# Patient Record
Sex: MALE | Race: WHITE | HISPANIC OR LATINO
[De-identification: names, ages, dates, MRNs, and addresses within clinical notes are randomized per-mention and may not be internally consistent; named-entity substitution may affect disease eponyms.]

---

## 2021-07-05 ENCOUNTER — TRANSCRIPTION ENCOUNTER (OUTPATIENT)
Age: 36
End: 2021-07-05

## 2021-07-09 ENCOUNTER — TRANSCRIPTION ENCOUNTER (OUTPATIENT)
Age: 36
End: 2021-07-09

## 2021-08-30 ENCOUNTER — TRANSCRIPTION ENCOUNTER (OUTPATIENT)
Age: 36
End: 2021-08-30

## 2021-09-03 ENCOUNTER — TRANSCRIPTION ENCOUNTER (OUTPATIENT)
Age: 36
End: 2021-09-03

## 2021-10-17 ENCOUNTER — TRANSCRIPTION ENCOUNTER (OUTPATIENT)
Age: 36
End: 2021-10-17

## 2022-04-13 PROBLEM — Z00.00 ENCOUNTER FOR PREVENTIVE HEALTH EXAMINATION: Status: ACTIVE | Noted: 2022-04-13

## 2022-09-25 ENCOUNTER — NON-APPOINTMENT (OUTPATIENT)
Age: 37
End: 2022-09-25

## 2022-09-28 ENCOUNTER — NON-APPOINTMENT (OUTPATIENT)
Age: 37
End: 2022-09-28

## 2023-07-07 ENCOUNTER — APPOINTMENT (OUTPATIENT)
Dept: VASCULAR SURGERY | Facility: CLINIC | Age: 38
End: 2023-07-07
Payer: COMMERCIAL

## 2023-07-07 VITALS — DIASTOLIC BLOOD PRESSURE: 79 MMHG | HEART RATE: 67 BPM | SYSTOLIC BLOOD PRESSURE: 112 MMHG

## 2023-07-07 VITALS
DIASTOLIC BLOOD PRESSURE: 74 MMHG | SYSTOLIC BLOOD PRESSURE: 118 MMHG | BODY MASS INDEX: 25.11 KG/M2 | HEIGHT: 67 IN | HEART RATE: 71 BPM | WEIGHT: 160 LBS

## 2023-07-07 PROCEDURE — 93971 EXTREMITY STUDY: CPT | Mod: RT

## 2023-07-07 PROCEDURE — 99203 OFFICE O/P NEW LOW 30 MIN: CPT

## 2023-07-19 ENCOUNTER — APPOINTMENT (OUTPATIENT)
Dept: THORACIC SURGERY | Facility: CLINIC | Age: 38
End: 2023-07-19
Payer: COMMERCIAL

## 2023-07-26 ENCOUNTER — APPOINTMENT (OUTPATIENT)
Dept: THORACIC SURGERY | Facility: CLINIC | Age: 38
End: 2023-07-26
Payer: COMMERCIAL

## 2023-07-26 ENCOUNTER — NON-APPOINTMENT (OUTPATIENT)
Age: 38
End: 2023-07-26

## 2023-07-26 VITALS
WEIGHT: 165 LBS | DIASTOLIC BLOOD PRESSURE: 78 MMHG | HEIGHT: 68 IN | OXYGEN SATURATION: 97 % | RESPIRATION RATE: 17 BRPM | HEART RATE: 76 BPM | SYSTOLIC BLOOD PRESSURE: 120 MMHG | BODY MASS INDEX: 25.01 KG/M2

## 2023-07-26 DIAGNOSIS — F12.90 CANNABIS USE, UNSPECIFIED, UNCOMPLICATED: ICD-10-CM

## 2023-07-26 DIAGNOSIS — G54.0 BRACHIAL PLEXUS DISORDERS: ICD-10-CM

## 2023-07-26 DIAGNOSIS — Z83.3 FAMILY HISTORY OF DIABETES MELLITUS: ICD-10-CM

## 2023-07-26 PROCEDURE — 99205 OFFICE O/P NEW HI 60 MIN: CPT

## 2023-07-27 NOTE — CONSULT LETTER
[FreeTextEntry2] : Dr. Neri Morris (ortho sx/ ref)  [FreeTextEntry3] : José Manuel Evans MD, FACS\par , Division of Thoracic Surgery\par [facility]\par Thoracic Surgery\par [facility]\par Department of Cardiovascular & Thoracic Surgery\par \par [facility] [employer] [facility]

## 2023-07-27 NOTE — HISTORY OF PRESENT ILLNESS
[FreeTextEntry1] : Mr. ARTURO NOVAK, 38 year old male, never smoker, w/ no known hx, who presented with right shoulder pain and weakness for 2 years. \par \par MRI Cervical Spine on 12/01/2022 ([facility]):\par - Straightening of the cervical spine, possibly secondary to muscle spasm/strain\par - Multilevel degenerative disc disease with spinal canal stenosis and neural foraminal narrowing as detailed above.\par \par MRI Cervical Spine on 06/23/2023 ([facility]):\par - Compared to MRI of the cervical spine dated December 1, 2022, similar pattern of cervical spondylosis, appears greatest at C5-6 with uncovertebral spurring, moderate right and mild left neural foraminal stenosis. Query C6 radicular symptoms particularly on the right.\par \par MRI Right Shoulder on 06/23/2023 ([facility]):\par - MRI of the right shoulder demonstrates moderate AC joint arthrosis without subacromial subdeltoid bursitis; otherwise, is unremarkable.\par \par Seen Laz Beard(Vascular surgery) on 7/6/2023.Of note, Venous duplex of right upper extremity with adduction and abduction is negative, suspect right thoracic outlet syndrome with neurogenic component. Pending functional MRI for anatomic clarification.\par \par Patient states he was seen by a neurologist, all tests were negative. \par \par Patient is here today for CT Sx consultation, referred by Dr. Neri Morris (ortho sx) for thoracic outlet syndrome. Patient c/o right arm weakness and heaviness with mild activity, ex. writing. He also c/o right shoulder shooting pain that radiates down the arm and it comes and goes. The pain has been improved with massage therapy x 8 months and denies the use of pain medications. C/o occasional hand numbness.

## 2023-07-27 NOTE — DATA REVIEWED
[FreeTextEntry1] : I have independently reviewed the following:\par MRI Cervical Spine on 12/01/2022\par MRI Cervical Spine on 06/23/2023\par MRI Right Shoulder on 06/23/2023

## 2023-07-27 NOTE — ASSESSMENT
[FreeTextEntry1] : Mr. ARTURO NOVAK, 38 year old male, never smoker, w/ no known hx, who presented with right shoulder pain and weakness for 2 years. \par \par Seen Laz Oglesbyin(Vascular surgery) on 7/6/2023.Of note, Venous duplex of right upper extremity with adduction and abduction is negative, suspect right thoracic outlet syndrome with neurogenic component. Pending functional MRI for anatomic clarification.\par \par Patient states he was seen by a neurologist, all tests were negative. \par \par Patient's right radial pulse diminishes with the Adsons test, left radial pulse remains intact. Together with patient's right shoulder and arm symptoms, there is a possibility for thoracic outlet syndrome(TOS) as the subclavian artery is being pinched and brachial plexus is being compressed with the maneuver. However, it is difficult to diagnose patients with TOS as at times it can only be diagnosed for sure with symptoms improvement after TOS surgery. TOS surgery discussed with patient as we can remove the first rib and divide the scalene muscle.With his symptoms approximately 60-70% of patients s/p first rib removal will have symptom improvement, however it won't help 30-40% of patients. Risks and benefits and alternatives explained to patient, all questions answered, patient will think about the surgery and call back if he decides to proceed.\par \par I, SANTIAGO Rascon, personally performed the evaluation and management (E/M) services for this established patient who presents today with (a) new problem(s)/exacerbation of (an) existing condition(s). That E/M includes conducting the examination, assessing all new/exacerbated conditions, and establishing a new plan of care.  Today, my RN, Alfonzo García, was here to observe my evaluation and management services for this new problem/exacerbated condition to be followed going forward.\par

## 2023-07-27 NOTE — PHYSICAL EXAM
[Fully active, able to carry on all pre-disease performance without restriction] : Status 0 - Fully active, able to carry on all pre-disease performance without restriction [General Appearance - Alert] : alert [General Appearance - In No Acute Distress] : in no acute distress [Sclera] : the sclera and conjunctiva were normal [PERRL With Normal Accommodation] : pupils were equal in size, round, and reactive to light [Extraocular Movements] : extraocular movements were intact [Outer Ear] : the ears and nose were normal in appearance [Oropharynx] : the oropharynx was normal [Neck Appearance] : the appearance of the neck was normal [Neck Cervical Mass (___cm)] : no neck mass was observed [Jugular Venous Distention Increased] : there was no jugular-venous distention [Thyroid Diffuse Enlargement] : the thyroid was not enlarged [Thyroid Nodule] : there were no palpable thyroid nodules [Auscultation Breath Sounds / Voice Sounds] : lungs were clear to auscultation bilaterally [Heart Rate And Rhythm] : heart rate was normal and rhythm regular [Heart Sounds] : normal S1 and S2 [Heart Sounds Gallop] : no gallops [Murmurs] : no murmurs [Heart Sounds Pericardial Friction Rub] : no pericardial rub [Examination Of The Chest] : the chest was normal in appearance [Chest Visual Inspection Thoracic Asymmetry] : no chest asymmetry [Diminished Respiratory Excursion] : normal chest expansion [2+] : left 2+ [No Abnormalities] : the abdominal aorta was not enlarged and no bruit was heard [Breast Appearance] : normal in appearance [Breast Palpation Mass] : no palpable masses [Bowel Sounds] : normal bowel sounds [Abdomen Soft] : soft [Abdomen Tenderness] : non-tender [Abdomen Mass (___ Cm)] : no abdominal mass palpated [Cervical Lymph Nodes Enlarged Posterior Bilaterally] : posterior cervical [Cervical Lymph Nodes Enlarged Anterior Bilaterally] : anterior cervical [Supraclavicular Lymph Nodes Enlarged Bilaterally] : supraclavicular [No CVA Tenderness] : no ~M costovertebral angle tenderness [No Spinal Tenderness] : no spinal tenderness [Abnormal Walk] : normal gait [Nail Clubbing] : no clubbing  or cyanosis of the fingernails [Musculoskeletal - Swelling] : no joint swelling seen [Motor Tone] : muscle strength and tone were normal [Skin Color & Pigmentation] : normal skin color and pigmentation [Skin Turgor] : normal skin turgor [] : no rash [Deep Tendon Reflexes (DTR)] : deep tendon reflexes were 2+ and symmetric [Sensation] : the sensory exam was normal to light touch and pinprick [No Focal Deficits] : no focal deficits [Oriented To Time, Place, And Person] : oriented to person, place, and time [Impaired Insight] : insight and judgment were intact [Affect] : the affect was normal [Right Carotid Bruit] : no bruit heard over the right carotid [Left Carotid Bruit] : no bruit heard over the left carotid [Right Femoral Bruit] : no bruit heard over the right femoral artery [Left Femoral Bruit] : no bruit heard over the left femoral artery [FreeTextEntry1] : deferred

## 2023-07-31 ENCOUNTER — APPOINTMENT (OUTPATIENT)
Dept: MRI IMAGING | Facility: CLINIC | Age: 38
End: 2023-07-31

## 2023-08-08 ENCOUNTER — APPOINTMENT (OUTPATIENT)
Dept: VASCULAR SURGERY | Facility: CLINIC | Age: 38
End: 2023-08-08

## 2023-08-08 ENCOUNTER — NON-APPOINTMENT (OUTPATIENT)
Age: 38
End: 2023-08-08

## 2023-08-10 ENCOUNTER — APPOINTMENT (OUTPATIENT)
Dept: MRI IMAGING | Facility: CLINIC | Age: 38
End: 2023-08-10

## 2023-08-24 ENCOUNTER — APPOINTMENT (OUTPATIENT)
Dept: MRI IMAGING | Facility: CLINIC | Age: 38
End: 2023-08-24
Payer: COMMERCIAL

## 2023-08-24 ENCOUNTER — RESULT REVIEW (OUTPATIENT)
Age: 38
End: 2023-08-24

## 2023-08-24 PROCEDURE — 71552 MRI CHEST W/O & W/DYE: CPT

## 2023-08-24 PROCEDURE — A9585: CPT | Mod: JW

## 2023-09-12 ENCOUNTER — APPOINTMENT (OUTPATIENT)
Dept: VASCULAR SURGERY | Facility: CLINIC | Age: 38
End: 2023-09-12
Payer: COMMERCIAL

## 2023-09-12 PROCEDURE — 99442: CPT

## 2025-01-27 ENCOUNTER — NON-APPOINTMENT (OUTPATIENT)
Age: 40
End: 2025-01-27

## 2025-01-27 ENCOUNTER — APPOINTMENT (OUTPATIENT)
Dept: UROLOGY | Facility: CLINIC | Age: 40
End: 2025-01-27
Payer: COMMERCIAL

## 2025-01-27 VITALS
WEIGHT: 175 LBS | HEIGHT: 67 IN | DIASTOLIC BLOOD PRESSURE: 79 MMHG | HEART RATE: 102 BPM | BODY MASS INDEX: 27.47 KG/M2 | SYSTOLIC BLOOD PRESSURE: 135 MMHG

## 2025-01-27 PROCEDURE — 99203 OFFICE O/P NEW LOW 30 MIN: CPT

## 2025-01-27 RX ORDER — CIPROFLOXACIN HYDROCHLORIDE 500 MG/1
500 TABLET, FILM COATED ORAL TWICE DAILY
Qty: 14 | Refills: 0 | Status: ACTIVE | COMMUNITY
Start: 2025-01-27 | End: 1900-01-01

## 2025-01-28 LAB
APPEARANCE: ABNORMAL
BACTERIA: NEGATIVE /HPF
BILIRUBIN URINE: NEGATIVE
BLOOD URINE: NEGATIVE
C TRACH RRNA SPEC QL NAA+PROBE: NOT DETECTED
CAST: 0 /LPF
COLOR: YELLOW
EPITHELIAL CELLS: 0 /HPF
GLUCOSE QUALITATIVE U: NEGATIVE MG/DL
KETONES URINE: ABNORMAL MG/DL
LEUKOCYTE ESTERASE URINE: NEGATIVE
MICROSCOPIC-UA: NORMAL
N GONORRHOEA RRNA SPEC QL NAA+PROBE: NOT DETECTED
NITRITE URINE: NEGATIVE
PH URINE: 5.5
PROTEIN URINE: NORMAL MG/DL
RED BLOOD CELLS URINE: 0 /HPF
SOURCE AMPLIFICATION: NORMAL
SPECIFIC GRAVITY URINE: >1.03
UROBILINOGEN URINE: 0.2 MG/DL
WHITE BLOOD CELLS URINE: 0 /HPF

## 2025-01-29 LAB
BACTERIA UR CULT: NORMAL
SOURCE AMPLIFICATION: NORMAL
T VAGINALIS RRNA SPEC QL NAA+PROBE: NOT DETECTED

## 2025-02-06 ENCOUNTER — APPOINTMENT (OUTPATIENT)
Dept: UROLOGY | Facility: CLINIC | Age: 40
End: 2025-02-06
Payer: COMMERCIAL

## 2025-02-06 VITALS
DIASTOLIC BLOOD PRESSURE: 72 MMHG | SYSTOLIC BLOOD PRESSURE: 115 MMHG | WEIGHT: 170 LBS | BODY MASS INDEX: 26.68 KG/M2 | HEIGHT: 67 IN | OXYGEN SATURATION: 98 % | HEART RATE: 78 BPM

## 2025-02-06 DIAGNOSIS — R30.0 DYSURIA: ICD-10-CM

## 2025-02-06 DIAGNOSIS — R53.83 OTHER FATIGUE: ICD-10-CM

## 2025-02-06 PROCEDURE — 99213 OFFICE O/P EST LOW 20 MIN: CPT

## 2025-02-11 ENCOUNTER — APPOINTMENT (OUTPATIENT)
Dept: GASTROENTEROLOGY | Facility: CLINIC | Age: 40
End: 2025-02-11

## 2025-03-04 ENCOUNTER — TRANSCRIPTION ENCOUNTER (OUTPATIENT)
Age: 40
End: 2025-03-04

## 2025-04-25 ENCOUNTER — NON-APPOINTMENT (OUTPATIENT)
Age: 40
End: 2025-04-25